# Patient Record
Sex: FEMALE | Race: OTHER
[De-identification: names, ages, dates, MRNs, and addresses within clinical notes are randomized per-mention and may not be internally consistent; named-entity substitution may affect disease eponyms.]

---

## 2017-04-12 ENCOUNTER — HOSPITAL ENCOUNTER (EMERGENCY)
Dept: HOSPITAL 74 - JERFT | Age: 27
Discharge: HOME | End: 2017-04-12
Payer: COMMERCIAL

## 2017-04-12 VITALS — TEMPERATURE: 98.8 F | DIASTOLIC BLOOD PRESSURE: 82 MMHG | HEART RATE: 108 BPM | SYSTOLIC BLOOD PRESSURE: 145 MMHG

## 2017-04-12 VITALS — BODY MASS INDEX: 31.8 KG/M2

## 2017-04-12 DIAGNOSIS — L02.412: Primary | ICD-10-CM

## 2017-04-12 PROCEDURE — 3E0233Z INTRODUCTION OF ANTI-INFLAMMATORY INTO MUSCLE, PERCUTANEOUS APPROACH: ICD-10-PCS

## 2017-04-12 NOTE — PDOC
[id number]: 17 14:04


History Source: Patient


Exam Limitations: No Limitations





- History of Present Illness


Initial Comments: 


17 14:26


c/o pain and swelling to left axilla 3 days. Patient states suffers from 

frequent axillary and buttock abscess. Has never needed incision and drainage, 

however responds well to antibiotics and most commonly Bactrim. States is been 

hot soaking . Also suffers from





17 16:58





Timing/Duration: reports: just prior to arrival, changing over time, getting 

worse


Severity: Yes: mild, moderate


Location: reports: torso (left axilla )


Respiratory Risk Factors: reports: no cause identified


Associated Symptoms: reports: denies symptoms





Past History





- Travel


Traveled outside of the country in the last 30 days: No


Close contact w/someone who was outside of country & ill: No





- Past Medical History


Allergies/Adverse Reactions: 


 Allergies











Allergy/AdvReac Type Severity Reaction Status Date / Time


 


No Known Allergies Allergy   Verified 17 12:26











Home Medications: 


Ambulatory Orders





Chlorhexidine Gluconate [Hibiclens For Decolonization -] 1 applic TP DAILY #1 

bottle 17 


Sulfamethoxazole/Trimethoprim [Bactrim *Ds*] 1 each PO BID #14 tablet 17 








Anemia: Yes (only during pregnancy)


Asthma: No


Cancer: No


Cardiac Disorders: No


Diabetes: No


HTN: No


Seizures: No


Thyroid Disease: Yes (hypothyroidism only during pregnancy)





- Reproductive History


 (#): 2


Para: 0


Therapeutic (s) & number: Yes


Spontaneous : 1





- Immunization History


Immunization Up to Date: Yes





- Psycho/Social/Smoking Cessation Hx


Anxiety: Yes


Suicidal Ideation: No


Smoking Status: No


Smoking History: Never smoked


Have you smoked in the past 12 months: No


Number of Cigarettes Smoked Daily: 0


If you are a former smoker, when did you quit?: 1 year ago


Information on smoking cessation initiated: No


Hx Alcohol Use: No


Drug/Substance Use Hx: No


Substance Use Type: None


Hx Substance Use Treatment: No





**Review of Systems





- Review of Systems


Able to Perform ROS?: Yes


Is the patient limited English proficient: Yes


Constitutional: Yes: Symptoms Reported, See HPI, Loss of Appetite, Malaise.  No

: Fever


HEENTM: No: Symptoms Reported


Respiratory: No: Symptoms reported


Musculoskeletal: Yes: Symptoms Reported


Integumentary: Yes: Symptoms Reported, Lumps


Neurological: No: Symptoms reported


All Other Systems: Reviewed and Negative





*Physical Exam





- Vital Signs


 Last Vital Signs











Temp Pulse Resp BP Pulse Ox


 


 98.8 F   108 H  19   145/82   99 


 


 17 12:24  17 12:24  17 12:24  17 12:24  17 12:24














- Physical Exam


General Appearance: Yes: Nourished, Appropriately Dressed, Apparent Distress, 

Mild Distress, Moderate Distress


HEENT: positive: TMs Normal, Pharynx Normal


Neck: positive: Supple.  negative: Tender, Lymphadenopathy (R), Lymphadenopathy 

(L)


Respiratory/Chest: positive: Lungs Clear, Normal Breath Sounds


Integumentary: positive: Pale, Other (erythema and exquisite tenderness to the 

left lateral aspect of her axilla, no pointing, no fluctuant lesion, is 

approximately 10 cm2. )


Neurologic: positive: CNs II-XII NML intact, Fully Oriented, Normal Response, 

Motor Strength 5/5





Progress Note





- Progress Note


Progress Note: 


Axillary abscess, possible hidradenitis. Been 2 tablets of Percocet, and IM 

dose of Toradol, and prescription for Bactrim DS





*DC/Admit/Observation/Transfer


Diagnosis at time of Disposition: 


 Abscess





- Discharge Dispostion


Disposition: HOME


Condition at time of disposition: Stable


Admit: No





- Prescriptions


Prescriptions: 


Sulfamethoxazole/Trimethoprim [Bactrim *Ds*] 1 each PO BID #14 tablet


Chlorhexidine Gluconate [Hibiclens For Decolonization -] 1 applic TP DAILY #1 

bottle





- Patient Instructions


Printed Discharge Instructions:  DI for Skin Abscess


Additional Instructions: 


Rest, keep area elevated. 


Avoid strenuous activity or exercise until wound is healed


Use hot soaks to area to bring more blood to the surface and encourage drainage





May use Tylenol or Motrin for mild pain relief


Use stronger medications as directed and prescribed


Continue all medications as prescribed


Followup with private physician in 2-3 days for wound check


Return to emergency Department for worsening swelling, pain, redness, fevers as 

needed





Mefoxin resistant Staphylococcus aureus is a normal skin bacteria and is 

mutated to be resistant to penicillin type drugs.


The wounds may be draining and there for contagious to other family members. 

Vigorous handwashing and avoidance of skin contact of draining lesions it is 

important .


All family members Will need to be protected and perform thorough cleaning of 

linens /towels/clothing. 





To decontaminate household:


Soak in bath;  in one half cup of bleach in 1 full tub of water 2 times a week 

x3 weeks


With own scrub Nylon use chlorohexidine soap twice a week to decontaminate skin


Clean tub /toilet with bleach wipes after each use


Do not use same linens/avoid contact until lesions are healed


Followup with private physician/dermatologist 





Take all of Bactrim as directed


May use ibuprofen or Tylenol for pain relief





Followup with PMD in one week if no resolution


Make appointment with dermatologist for evaluation when possible





- Post Discharge Activity


Work/School Note:  Back to Work

## 2017-07-16 ENCOUNTER — HOSPITAL ENCOUNTER (EMERGENCY)
Dept: HOSPITAL 74 - JERFT | Age: 27
Discharge: HOME | End: 2017-07-16
Payer: SELF-PAY

## 2017-07-16 VITALS — SYSTOLIC BLOOD PRESSURE: 150 MMHG | TEMPERATURE: 97.7 F | DIASTOLIC BLOOD PRESSURE: 90 MMHG | HEART RATE: 88 BPM

## 2017-07-16 VITALS — BODY MASS INDEX: 31.2 KG/M2

## 2017-07-16 DIAGNOSIS — M72.2: Primary | ICD-10-CM

## 2019-06-22 ENCOUNTER — HOSPITAL ENCOUNTER (EMERGENCY)
Dept: HOSPITAL 74 - JER | Age: 29
Discharge: HOME | End: 2019-06-22
Payer: COMMERCIAL

## 2019-06-22 VITALS — DIASTOLIC BLOOD PRESSURE: 65 MMHG | HEART RATE: 78 BPM | SYSTOLIC BLOOD PRESSURE: 101 MMHG

## 2019-06-22 VITALS — TEMPERATURE: 98.1 F

## 2019-06-22 VITALS — BODY MASS INDEX: 31.8 KG/M2

## 2019-06-22 DIAGNOSIS — G40.909: ICD-10-CM

## 2019-06-22 DIAGNOSIS — R55: Primary | ICD-10-CM

## 2019-06-22 DIAGNOSIS — R00.2: ICD-10-CM

## 2019-06-22 DIAGNOSIS — G43.909: ICD-10-CM

## 2019-06-22 LAB
ALBUMIN SERPL-MCNC: 3.7 G/DL (ref 3.4–5)
ALP SERPL-CCNC: 38 U/L (ref 45–117)
ALT SERPL-CCNC: 21 U/L (ref 13–61)
ANION GAP SERPL CALC-SCNC: 5 MMOL/L (ref 8–16)
AST SERPL-CCNC: 15 U/L (ref 15–37)
BASOPHILS # BLD: 0.3 % (ref 0–2)
BILIRUB SERPL-MCNC: 0.3 MG/DL (ref 0.2–1)
BUN SERPL-MCNC: 12.6 MG/DL (ref 7–18)
CALCIUM SERPL-MCNC: 8.7 MG/DL (ref 8.5–10.1)
CHLORIDE SERPL-SCNC: 109 MMOL/L (ref 98–107)
CO2 SERPL-SCNC: 25 MMOL/L (ref 21–32)
CREAT SERPL-MCNC: 0.6 MG/DL (ref 0.55–1.3)
DEPRECATED RDW RBC AUTO: 13.8 % (ref 11.6–15.6)
EOSINOPHIL # BLD: 0.6 % (ref 0–4.5)
GLUCOSE SERPL-MCNC: 89 MG/DL (ref 74–106)
HCT VFR BLD CALC: 41.6 % (ref 32.4–45.2)
HGB BLD-MCNC: 13.6 GM/DL (ref 10.7–15.3)
LYMPHOCYTES # BLD: 13.8 % (ref 8–40)
MCH RBC QN AUTO: 27.2 PG (ref 25.7–33.7)
MCHC RBC AUTO-ENTMCNC: 32.7 G/DL (ref 32–36)
MCV RBC: 83 FL (ref 80–96)
MONOCYTES # BLD AUTO: 6.4 % (ref 3.8–10.2)
NEUTROPHILS # BLD: 78.9 % (ref 42.8–82.8)
PLATELET # BLD AUTO: 160 K/MM3 (ref 134–434)
PMV BLD: 10.8 FL (ref 7.5–11.1)
POTASSIUM SERPLBLD-SCNC: 4.4 MMOL/L (ref 3.5–5.1)
PROT SERPL-MCNC: 7.2 G/DL (ref 6.4–8.2)
RBC # BLD AUTO: 5.01 M/MM3 (ref 3.6–5.2)
SODIUM SERPL-SCNC: 139 MMOL/L (ref 136–145)
WBC # BLD AUTO: 6.7 K/MM3 (ref 4–10)

## 2019-06-22 PROCEDURE — 3E033NZ INTRODUCTION OF ANALGESICS, HYPNOTICS, SEDATIVES INTO PERIPHERAL VEIN, PERCUTANEOUS APPROACH: ICD-10-PCS | Performed by: EMERGENCY MEDICINE

## 2019-06-22 NOTE — PDOC
History of Present Illness





- General


Chief Complaint: Syncope/Near Syncope


Stated Complaint: SYNCOPE/NEAR SYNCOPE


Time Seen by Provider: 19 15:48


History Source: Patient





- History of Present Illness


Initial Comments: 





19 16:09


The patient is a 29 year old female with a PMH of migraines, palpitations and 

seizures who presents to our ED c/o syncope.  Patient states she was walking in 

her hallway around 2 p.m. when she suddenly saw flashes of white light and felt 

very hot.  The next thing she remembers was waking up on the hallway floor flat 

on her face.  Estimates she was down for 4-5 minutes.  H/o similar episode last 

year for which she was evaluated by Dr. Neil (neurology) and had an MRI 

however she never followed up with neurology.   States she started having 

similar episodes since age 21, approximately 1-2x yearly and with some of the 

witnessed episodes she was told she had shaking movements of her arms and legs. 





NKDA


Surgical: C/S, Surgical 


Medications: Ibuprofen


Social: daily vaping, daily marijuana, 3-4 cigarettes daily social alcohol, 

denies other toxic habits 


PMD: None- will refer to IM resident clinic





As per EMR, no previous evaluations in our ED for similar symptoms.














Past History





- Past Medical History


Allergies/Adverse Reactions: 


 Allergies











Allergy/AdvReac Type Severity Reaction Status Date / Time


 


No Known Allergies Allergy   Verified 17 15:27











Home Medications: 


Ambulatory Orders





Chlorhexidine Gluconate [Hibiclens For Decolonization -] 1 applic TP DAILY #1 

bottle 17 


Sulfamethoxazole/Trimethoprim [Bactrim *Ds*] 1 each PO BID #14 tablet 17 








Anemia: Yes (only during pregnancy)


Asthma: No


Cancer: No


Cardiac Disorders: No


COPD: No


Diabetes: No


HTN: No


Seizures: No


Thyroid Disease: Yes (hypothyroidism only during pregnancy)





- Reproductive History


 (#): 2


Para: 0


Therapeutic (s) & number: Yes


Spontaneous : 1





- Immunization History


Immunization Up to Date: Yes





- Suicide/Smoking/Psychosocial Hx


Smoking Status: No


Smoking History: Current every day smoker


Have you smoked in the past 12 months: Yes


Number of Cigarettes Smoked Daily: 4


If you are a former smoker, when did you quit?: 1 year ago


Information on smoking cessation initiated: No


'Breaking Loose' booklet given: 17


Hx Alcohol Use: No


Drug/Substance Use Hx: Yes


Substance Use Type: None


Hx Substance Use Treatment: No





**Review of Systems





- Review of Systems


Constitutional: No: Chills, Fever


HEENTM: No: Blurred Vision, Recent change in vision


Respiratory: No: Cough, Shortness of Breath


Cardiac (ROS): Yes: Syncope.  No: Chest Pain, Lightheadedness, Palpitations


ABD/GI: No: Constipated, Diarrhea, Nausea, Vomiting





*Physical Exam





- Vital Signs


 Last Vital Signs











Temp Pulse Resp BP Pulse Ox


 


 98.1 F   88   18   98/64   100 


 


 19 15:34  19 15:34  19 15:34  19 15:34  19 15:34














- Physical Exam


General Appearance: Yes: Nourished, Appropriately Dressed


HEENT: positive: Normal Voice, Hearing Grossly Normal, Other (no periorbital 

ecchymosis, no mastoid ecchymosis, no hemotympanum )


Neck: positive: Trachea midline, Supple


Respiratory/Chest: positive: Lungs Clear, Normal Breath Sounds


Cardiovascular: positive: S1, S2.  negative: Edema, JVD, Murmur


Vascular Pulses: Dorsalis-Pedis (R): 2+, Doralis-Pedis (L): 2+


Gastrointestinal/Abdominal: positive: Normal Bowel Sounds, Soft


Musculoskeletal: negative: CVA Tenderness (R), CVA Tenderness (L)


Extremity: positive: Normal Capillary Refill, Normal Inspection


Integumentary: positive: Normal Color, Dry, Warm


Neurologic: positive: CNs II-XII NML intact, Fully Oriented, Alert





ED Treatment Course





- LABORATORY


CBC & Chemistry Diagram: 


 19 16:09





 19 16:09





Medical Decision Making





- Medical Decision Making





19 16:15


29 year old female s/p syncopal episode vs. seizure.  Hypotensive, other VS 

unremarkable.  No focal neurological deficit noted on exam. H/o MRI for seizures

/syncope with limited f/u evaluation. 





19 17:27


CBC, CMP unremarkable


Serum pregnancy (+), patient s/p surgical  - 2 weeks previous, likely 

residual B-HCG, given patient's h/o recent pregnancy, and given patient's h/o 

seizure/syncopal episode, unlikely to be related to patient's current medical 

condition





19 17:58


Patient reassessed @ bedside


VSS





19 18:29


Dr. Neil, patient's paged, awaiting callback to confirm patient ok for 

outpatient follow-up and evaluate for possible Keppra loading prior to 

discharge.


Patient symptomatically improved.


Will discharge home with return precautions and neurology follow-up.





19 20:09


Call received from Dr. Neil, agrees w/ discharge, will call patient on 

Monday 








I discussed the physical exam findings, ancillary test results and final 

diagnoses with the patient. I answered all of the patient's questions. The 

patient was satisfied with the care received and felt comfortable with the 

discharge plan and treatment plan. The patient will return to the Emergency 

Department with any new, persistent or worsening symptoms.








*DC/Admit/Observation/Transfer


Diagnosis at time of Disposition: 


 Syncope








- Discharge Dispostion


Disposition: HOME


Condition at time of disposition: Good


Decision to Admit order: No





- Referrals


Referrals: 


Hung Neil MD [Staff Physician] - 


Emery Rincon MD [Staff Physician] - 





- Patient Instructions


Printed Discharge Instructions:  DI for Syncope in Adults (Fainting)


Additional Instructions: 


Please call Dr. Neil's office on Monday and make an appointment for further 

evaluation.  Your care is not complete until you are evaluated by a 

neurologist.  





We have provided a referral to a primary care doctor or you can call your 

insurance company for a list of doctors.  Make an appointment to establish 

primary care in the next 1 week.





Return to the Emergency Department immediately for any new/worsening/concerning 

symptoms.





- Post Discharge Activity

## 2019-06-22 NOTE — PDOC
Documentation entered by Mirna Adler SCRIBE, acting as scribe for Wayne Zhu MD.








Wanye Zhu MD:  This documentation has been prepared by the Vitor oswald Brenda, SCRIBE, under my direction and personally reviewed by me in its 

entirety.  I confirm that the documentation accurately reflects all work, 

treatment, procedures, and medical decision making performed by me.  





Attending Attestation





- Resident


Resident Name: SanamKaren





- ED Attending Attestation


I have performed the following: I have examined & evaluated the patient, The 

case was reviewed & discussed with the resident, I agree w/resident's findings 

& plan, Exceptions are as noted





- HPI


HPI: 





19 16:49


The patient is a 29 year old female, with a significant PMH of palpitations, 

migraines and seizures who presents to the emergency department with a syncope 

episode at around 2:00pm. The patient reports taking a walk around her hallway 

this afternoon, when had a sudden onset of a heat flash and states seeing a 

white light. The patient reports only remembering waking up on her hallway floor

, with her face flat on the floor. The patient believes she was out of 

conscious for 4-5 minutes. The patient admits to having a similar episode last 

year, at which time she was evaluated by a neurologist, and had an MRI done, 

but admits to not following up. The patient also admits having similar episodes 

since she was 21 years old, about once or twice a year. She also states that 

some episodes were witnessed, where testimonies have said that she had shaking

 movements of her arms and legs. 





The patient denies chest pain, shortness of breath. Denies fever, chills, nausea

, vomiting, diarrhea and constipation.





Allergies: NKA


Past surgical history: Surgical 


Social history: Daily vaping. Daily marijuana. 3-4 cigarettes daily. Social 

etoh user. Denies any other drug use. 


PCP: No PCP. 


 











- Physicial Exam


PE: 





19 16:49


Vitals: Triage vital signs reviewed


General Appearance: No acute distress, well nourished, well developed


Head: Atraumatic


Eyes: Pupils equal reactive round, extraocular movement intact


Neck:  Supple; No nuchal rigidity


Chest Wall: Nontender


Cardiac: Regular rate and rhythm, no murmurs, no rubs, no gallops


Lungs: Clear to auscultation bilateral, good air movement bilaterally


Abdomen:  Soft, nondistended, normal bowel sounds, nontender to palpation


Extremities: Full range of motion to all extremities, no cyanosis, clubbing, or 

edema


Skin:  Warm and dry, no rashes or lesions, no rash, no petechiae


Neuro:  AOX3; Cranial Nerves 2-12 grossly intact, Strength intact to all 

extremities, Sensation intact to all extremities, gait normal, normal speech. 


Psych: Normal mood, normal affect





- Medical Decision Making





19 21:19





EKG performed at 1647 demonstrates normal sinus rhythm no ST elevations or T-

wave inversions. No evidence of WPW, prolonged QT, Brugada





Interpreted by me.





29 years old presents ED with syncope versus seizure. Patient experiences once 

of these episodes approximately once to twice a year there is a clear prodrome 

of lights flashing. She had started to be seen by neurology had an MRI done 

which reportedly was normal but never followed up after that. Her neurologist 

is Dr. Bella.





Here in the emergency department patient with normal neurologic examination 

complaining of very mild headache which responded well to Tylenol





At this point given multiple head CTs in the past and a history which is 

consistent with previous episodes no indication for additional imaging at this 

time





She will stay with her mother tonight who or return to the emergency department 

for any severe headache weakness numbness or change in behavior





We have spoken with her neurologist who will follow up with her on Monday





No indication for initiating Keppra at this time





Findings, the need for follow-up and very strict return instructions discussed 

with patient.

## 2019-06-23 NOTE — EKG
Test Reason : 

Blood Pressure : ***/*** mmHG

Vent. Rate : 073 BPM     Atrial Rate : 073 BPM

   P-R Int : 154 ms          QRS Dur : 074 ms

    QT Int : 380 ms       P-R-T Axes : 031 065 043 degrees

   QTc Int : 418 ms

 

NORMAL SINUS RHYTHM

NORMAL ECG

WHEN COMPARED WITH ECG OF 10-FIDENCIO-2014 12:12,

NO SIGNIFICANT CHANGE WAS FOUND

Confirmed by CONSUELO URIAS MD ([id number]) on 6/23/2019 1:11:59 PM

 

Referred By:             Confirmed By:CONSUELO URIAS MD